# Patient Record
Sex: FEMALE | Race: WHITE | Employment: UNEMPLOYED | URBAN - METROPOLITAN AREA
[De-identification: names, ages, dates, MRNs, and addresses within clinical notes are randomized per-mention and may not be internally consistent; named-entity substitution may affect disease eponyms.]

---

## 2017-07-13 ENCOUNTER — TELEPHONE (OUTPATIENT)
Dept: FAMILY MEDICINE CLINIC | Facility: CLINIC | Age: 37
End: 2017-07-13

## 2017-07-13 DIAGNOSIS — Z00.00 LABORATORY EXAMINATION ORDERED AS PART OF A ROUTINE GENERAL MEDICAL EXAMINATION: Primary | ICD-10-CM

## 2017-07-13 DIAGNOSIS — Z13.29 SCREENING FOR ENDOCRINE, NUTRITIONAL, METABOLIC AND IMMUNITY DISORDER: ICD-10-CM

## 2017-07-13 DIAGNOSIS — Z13.228 SCREENING FOR ENDOCRINE, NUTRITIONAL, METABOLIC AND IMMUNITY DISORDER: ICD-10-CM

## 2017-07-13 DIAGNOSIS — Z13.0 SCREENING FOR IRON DEFICIENCY ANEMIA: ICD-10-CM

## 2017-07-13 DIAGNOSIS — Z13.21 SCREENING FOR ENDOCRINE, NUTRITIONAL, METABOLIC AND IMMUNITY DISORDER: ICD-10-CM

## 2017-07-13 DIAGNOSIS — Z13.0 SCREENING FOR ENDOCRINE, NUTRITIONAL, METABOLIC AND IMMUNITY DISORDER: ICD-10-CM

## 2017-07-14 NOTE — TELEPHONE ENCOUNTER
Can we order CBC, CMP, lipid for this patient under Nicole Prabhakar name: indication: general labs ordered as part of routine physical exam    thanks

## 2017-07-14 NOTE — TELEPHONE ENCOUNTER
Orders entered, Pt notified to  copy of orders to bring to [facility]. Copy at

## 2017-09-15 ENCOUNTER — TELEPHONE (OUTPATIENT)
Dept: FAMILY MEDICINE CLINIC | Facility: CLINIC | Age: 37
End: 2017-09-15

## 2017-09-18 ENCOUNTER — OFFICE VISIT (OUTPATIENT)
Dept: FAMILY MEDICINE CLINIC | Facility: CLINIC | Age: 37
End: 2017-09-18

## 2017-09-18 VITALS
DIASTOLIC BLOOD PRESSURE: 68 MMHG | BODY MASS INDEX: 20.97 KG/M2 | WEIGHT: 140 LBS | RESPIRATION RATE: 10 BRPM | HEIGHT: 68.5 IN | SYSTOLIC BLOOD PRESSURE: 102 MMHG | HEART RATE: 80 BPM

## 2017-09-18 DIAGNOSIS — Z00.00 ROUTINE GENERAL MEDICAL EXAMINATION AT A HEALTH CARE FACILITY: Primary | ICD-10-CM

## 2017-09-18 DIAGNOSIS — Z23 NEEDS FLU SHOT: ICD-10-CM

## 2017-09-18 PROCEDURE — 99395 PREV VISIT EST AGE 18-39: CPT | Performed by: FAMILY MEDICINE

## 2017-09-18 PROCEDURE — 90686 IIV4 VACC NO PRSV 0.5 ML IM: CPT | Performed by: FAMILY MEDICINE

## 2017-09-18 PROCEDURE — 90471 IMMUNIZATION ADMIN: CPT | Performed by: FAMILY MEDICINE

## 2017-09-18 NOTE — PROGRESS NOTES
HPI:   Randall Craft is a 29year old female who presents for a complete physical exam.    Last pap:  2015 - normal  Last mammogram:  n/a   Self exams:  yes  Menses:  Every 19-21 days  Contraception:  none  Previous colonoscopy:  n/a  Previous DEXA: Ht 68.5\"   Wt 140 lb   LMP 08/31/2017   Breastfeeding? No   BMI 20.98 kg/m²   Body mass index is 20.98 kg/m².    GENERAL: well developed, well nourished,in no apparent distress  SKIN: no rashes,no suspicious lesions  HEENT: atraumatic, normocephalic,throat

## 2017-12-13 ENCOUNTER — TELEPHONE (OUTPATIENT)
Dept: FAMILY MEDICINE CLINIC | Facility: CLINIC | Age: 37
End: 2017-12-13

## 2017-12-13 NOTE — TELEPHONE ENCOUNTER
Discussed results with patient, pt verbalized understanding.  Results were abstracted and then sent to scan

## 2018-08-30 PROBLEM — Z82.49 FAMILY HISTORY OF ASD (ATRIAL SEPTAL DEFECT): Status: ACTIVE | Noted: 2018-08-30

## 2018-08-30 PROBLEM — O09.529 AMA (ADVANCED MATERNAL AGE) MULTIGRAVIDA 35+: Status: ACTIVE | Noted: 2018-08-30

## 2018-08-30 PROBLEM — O09.299 HISTORY OF MACROSOMIA IN INFANT IN PRIOR PREGNANCY, CURRENTLY PREGNANT: Status: ACTIVE | Noted: 2018-08-30

## 2018-08-30 PROBLEM — Z82.79 FAMILY HISTORY OF ASD (ATRIAL SEPTAL DEFECT): Status: ACTIVE | Noted: 2018-08-30

## 2018-08-30 PROBLEM — O09.299 HISTORY OF MACROSOMIA IN INFANT IN PRIOR PREGNANCY, CURRENTLY PREGNANT (HCC): Status: ACTIVE | Noted: 2018-08-30

## 2018-08-30 PROBLEM — O09.529 AMA (ADVANCED MATERNAL AGE) MULTIGRAVIDA 35+ (HCC): Status: ACTIVE | Noted: 2018-08-30

## 2018-09-05 ENCOUNTER — TELEPHONE (OUTPATIENT)
Dept: FAMILY MEDICINE CLINIC | Facility: CLINIC | Age: 38
End: 2018-09-05

## 2018-09-05 NOTE — TELEPHONE ENCOUNTER
Pt is pregnant and has abdominal pain, cramping and diarrhea. Afebrile. Advised that pt call her OB. She verbalized understanding. She will call back if OB feels that it is not related to her pregnancy.

## 2018-09-24 PROCEDURE — 86901 BLOOD TYPING SEROLOGIC RH(D): CPT | Performed by: OBSTETRICS & GYNECOLOGY

## 2018-09-24 PROCEDURE — 86780 TREPONEMA PALLIDUM: CPT | Performed by: OBSTETRICS & GYNECOLOGY

## 2018-09-24 PROCEDURE — 87086 URINE CULTURE/COLONY COUNT: CPT | Performed by: OBSTETRICS & GYNECOLOGY

## 2018-09-24 PROCEDURE — 86850 RBC ANTIBODY SCREEN: CPT | Performed by: OBSTETRICS & GYNECOLOGY

## 2018-09-24 PROCEDURE — 86762 RUBELLA ANTIBODY: CPT | Performed by: OBSTETRICS & GYNECOLOGY

## 2018-09-24 PROCEDURE — 86900 BLOOD TYPING SEROLOGIC ABO: CPT | Performed by: OBSTETRICS & GYNECOLOGY

## 2018-09-24 PROCEDURE — 87389 HIV-1 AG W/HIV-1&-2 AB AG IA: CPT | Performed by: OBSTETRICS & GYNECOLOGY

## 2018-09-25 PROBLEM — Z67.91 RH NEGATIVE STATE IN ANTEPARTUM PERIOD (HCC): Status: ACTIVE | Noted: 2018-09-25

## 2018-09-25 PROBLEM — Z67.91 RH NEGATIVE STATE IN ANTEPARTUM PERIOD: Status: ACTIVE | Noted: 2018-09-25

## 2018-09-25 PROBLEM — O26.899 RH NEGATIVE STATE IN ANTEPARTUM PERIOD (HCC): Status: ACTIVE | Noted: 2018-09-25

## 2018-09-25 PROBLEM — O26.899 RH NEGATIVE STATE IN ANTEPARTUM PERIOD: Status: ACTIVE | Noted: 2018-09-25

## 2018-11-20 ENCOUNTER — OFFICE VISIT (OUTPATIENT)
Dept: PERINATAL CARE | Facility: HOSPITAL | Age: 38
End: 2018-11-20
Attending: OBSTETRICS & GYNECOLOGY
Payer: COMMERCIAL

## 2018-11-20 VITALS
HEIGHT: 69 IN | HEART RATE: 69 BPM | DIASTOLIC BLOOD PRESSURE: 75 MMHG | SYSTOLIC BLOOD PRESSURE: 141 MMHG | BODY MASS INDEX: 22.96 KG/M2 | WEIGHT: 155 LBS

## 2018-11-20 DIAGNOSIS — O09.522 MULTIGRAVIDA OF ADVANCED MATERNAL AGE IN SECOND TRIMESTER: ICD-10-CM

## 2018-11-20 DIAGNOSIS — Z82.49 FAMILY HISTORY OF ASD (ATRIAL SEPTAL DEFECT): ICD-10-CM

## 2018-11-20 PROCEDURE — 76811 OB US DETAILED SNGL FETUS: CPT | Performed by: OBSTETRICS & GYNECOLOGY

## 2018-11-20 PROCEDURE — 99243 OFF/OP CNSLTJ NEW/EST LOW 30: CPT | Performed by: OBSTETRICS & GYNECOLOGY

## 2018-11-20 NOTE — PROGRESS NOTES
Indication: prev baby with asd. Maternal age (40 years). ____________________________________________________________________________  History: Age: 40 years. Maternal age at [facility]: 41 years.  : 4 Para: 3.  _______________________________________ Normal.  Genitalia: Male fetus.     ____________________________________________________________________________  Maternal Structures:  Cervix: Cervical length: 45 mm.  ____________________________________________________________________________  Comments: generally shown good pregnancy outcomes in women of advanced reproductive age. However, these women are at increased risk of infertility and pregnancy complications.   They are more likely to conceive with the assistance of assisted reproductive technology age 36 and is as high as 28 percent in women over age 48. The incidence of gestational diabetes in the general obstetric population is 3 percent, rising to 7 to 15 percent in women over age 36 and 21 percent in women over age 48.   Women 28years of age o measurements are consistent with the established EDC. No ultrasound evidence of structural abnormalities are seen today. The nasal bone is present. No ultrasound evidence of markers for aneuploidy are seen.  She understands that ultrasound exam cannot ex

## 2018-12-20 ENCOUNTER — OFFICE VISIT (OUTPATIENT)
Dept: PERINATAL CARE | Facility: HOSPITAL | Age: 38
End: 2018-12-20
Attending: OBSTETRICS & GYNECOLOGY
Payer: COMMERCIAL

## 2018-12-20 VITALS — SYSTOLIC BLOOD PRESSURE: 130 MMHG | HEART RATE: 98 BPM | DIASTOLIC BLOOD PRESSURE: 80 MMHG

## 2018-12-20 DIAGNOSIS — Z82.49 FAMILY HISTORY OF ASD (ATRIAL SEPTAL DEFECT): ICD-10-CM

## 2018-12-20 DIAGNOSIS — O09.522 MULTIGRAVIDA OF ADVANCED MATERNAL AGE IN SECOND TRIMESTER: ICD-10-CM

## 2018-12-20 PROCEDURE — 76825 ECHO EXAM OF FETAL HEART: CPT | Performed by: OBSTETRICS & GYNECOLOGY

## 2018-12-20 PROCEDURE — 76827 ECHO EXAM OF FETAL HEART: CPT | Performed by: OBSTETRICS & GYNECOLOGY

## 2018-12-20 PROCEDURE — 99213 OFFICE O/P EST LOW 20 MIN: CPT | Performed by: OBSTETRICS & GYNECOLOGY

## 2018-12-20 PROCEDURE — 93325 DOPPLER ECHO COLOR FLOW MAPG: CPT | Performed by: OBSTETRICS & GYNECOLOGY

## 2018-12-20 NOTE — PROGRESS NOTES
Indication: child with VSD. Maternal age (41 years). ____________________________________________________________________________  History: Age: 41 years. Maternal age at [facility]: 41 years. : 4 Para: 3.  Last menstrual period: 2018.  ________ etiology found. Allergy - amoxicillin (hives)    PMH -   unremarkable     PSH -  none      Medication -    PNV    Family hx-  no family history of birth defects other than their daughter's VSD, no developmental delay and no known genetic syndromes. minutes in evaluation, consultation, and coordination of care. Greater than 50% of this time was spent in face to face discussion with the patient.

## 2019-01-10 ENCOUNTER — LAB ENCOUNTER (OUTPATIENT)
Dept: LAB | Age: 39
End: 2019-01-10
Attending: OBSTETRICS & GYNECOLOGY
Payer: COMMERCIAL

## 2019-01-10 ENCOUNTER — OFFICE VISIT (OUTPATIENT)
Dept: FAMILY MEDICINE CLINIC | Facility: CLINIC | Age: 39
End: 2019-01-10
Payer: COMMERCIAL

## 2019-01-10 VITALS
HEIGHT: 68.5 IN | SYSTOLIC BLOOD PRESSURE: 112 MMHG | RESPIRATION RATE: 16 BRPM | HEART RATE: 86 BPM | WEIGHT: 173 LBS | TEMPERATURE: 98 F | DIASTOLIC BLOOD PRESSURE: 66 MMHG | BODY MASS INDEX: 25.92 KG/M2

## 2019-01-10 DIAGNOSIS — B96.89 BACTERIAL CONJUNCTIVITIS OF BOTH EYES: Primary | ICD-10-CM

## 2019-01-10 DIAGNOSIS — H10.9 BACTERIAL CONJUNCTIVITIS OF BOTH EYES: Primary | ICD-10-CM

## 2019-01-10 DIAGNOSIS — Z36.9 ENCOUNTER FOR ANTENATAL SCREENING OF MOTHER: ICD-10-CM

## 2019-01-10 LAB
ANTIBODY SCREEN: NEGATIVE
GLUCOSE 1H P GLC SERPL-MCNC: 164 MG/DL
HCT VFR BLD AUTO: 31 % (ref 34–50)
HGB BLD-MCNC: 10.1 G/DL (ref 12–16)
T PALLIDUM AB SER QL IA: NONREACTIVE

## 2019-01-10 PROCEDURE — 87389 HIV-1 AG W/HIV-1&-2 AB AG IA: CPT

## 2019-01-10 PROCEDURE — 86780 TREPONEMA PALLIDUM: CPT

## 2019-01-10 PROCEDURE — 82950 GLUCOSE TEST: CPT

## 2019-01-10 PROCEDURE — 99213 OFFICE O/P EST LOW 20 MIN: CPT | Performed by: FAMILY MEDICINE

## 2019-01-10 PROCEDURE — 86850 RBC ANTIBODY SCREEN: CPT

## 2019-01-10 PROCEDURE — 85018 HEMOGLOBIN: CPT

## 2019-01-10 PROCEDURE — 36415 COLL VENOUS BLD VENIPUNCTURE: CPT

## 2019-01-10 PROCEDURE — 85014 HEMATOCRIT: CPT

## 2019-01-10 RX ORDER — POLYMYXIN B SULFATE AND TRIMETHOPRIM 1; 10000 MG/ML; [USP'U]/ML
SOLUTION OPHTHALMIC
Qty: 10 ML | Refills: 0 | Status: SHIPPED | OUTPATIENT
Start: 2019-01-10 | End: 2019-03-04

## 2019-01-10 NOTE — PROGRESS NOTES
Maryanne Kumar is a 41year old female. S:  Patient presents today with the following concerns:  · Began yesterday with bilateral eye redness. Not itching or painful. Eyes crusted shut with mucous this morning.   Has 3 children and watches two little o photophobia. No crusting currently.   NECK: supple,no adenopathy, no preauricular adenopathy  LUNGS: CTA, no RRW  CARDIO: RRR without murmur  EXTREMITIES: no edema  NEURO: Oriented times three,cranial nerves are intact,motor and sensory are grossly intact

## 2019-01-10 NOTE — PATIENT INSTRUCTIONS
Bacterial Conjunctivitis    You have an infection in the membranes covering the white part of the eye. This part of the eye is called the conjunctiva. The infection is called conjunctivitis.  The most common symptoms of conjunctivitis include a thick, pus © 8673-9142 [employer] [address]. [address] [facility], [facility] [place]. All rights reserved. This information is not intended as a substitute for professional medical care. Always follow your healthcare professional's instructions.

## 2019-01-12 ENCOUNTER — LABORATORY ENCOUNTER (OUTPATIENT)
Dept: LAB | Facility: HOSPITAL | Age: 39
End: 2019-01-12
Attending: OBSTETRICS & GYNECOLOGY
Payer: COMMERCIAL

## 2019-01-12 DIAGNOSIS — O99.810 ABNORMAL MATERNAL GLUCOSE TOLERANCE, ANTEPARTUM: ICD-10-CM

## 2019-01-12 LAB
1 HR GLUCOSE GESTATIONAL: 160 MG/DL
GLUCOSE 1H P GLC SERPL-MCNC: 143 MG/DL
GLUCOSE 3H P GLC SERPL-MCNC: 115 MG/DL
GLUCOSE BLD-MCNC: 72 MG/DL (ref 65–99)
GLUCOSE P FAST SERPL-MCNC: 72 MG/DL

## 2019-01-12 PROCEDURE — 82962 GLUCOSE BLOOD TEST: CPT

## 2019-01-12 PROCEDURE — 82951 GLUCOSE TOLERANCE TEST (GTT): CPT

## 2019-01-12 PROCEDURE — 82952 GTT-ADDED SAMPLES: CPT

## 2019-01-12 PROCEDURE — 36415 COLL VENOUS BLD VENIPUNCTURE: CPT

## 2019-02-11 ENCOUNTER — OFFICE VISIT (OUTPATIENT)
Dept: PERINATAL CARE | Facility: HOSPITAL | Age: 39
End: 2019-02-11
Attending: OBSTETRICS & GYNECOLOGY
Payer: COMMERCIAL

## 2019-02-11 VITALS
DIASTOLIC BLOOD PRESSURE: 76 MMHG | HEART RATE: 84 BPM | BODY MASS INDEX: 26 KG/M2 | WEIGHT: 172 LBS | SYSTOLIC BLOOD PRESSURE: 116 MMHG

## 2019-02-11 DIAGNOSIS — O09.523 MULTIGRAVIDA OF ADVANCED MATERNAL AGE IN THIRD TRIMESTER: ICD-10-CM

## 2019-02-11 PROCEDURE — 76819 FETAL BIOPHYS PROFIL W/O NST: CPT | Performed by: OBSTETRICS & GYNECOLOGY

## 2019-02-11 PROCEDURE — 99213 OFFICE O/P EST LOW 20 MIN: CPT | Performed by: OBSTETRICS & GYNECOLOGY

## 2019-02-11 PROCEDURE — 76805 OB US >/= 14 WKS SNGL FETUS: CPT | Performed by: OBSTETRICS & GYNECOLOGY

## 2019-02-11 NOTE — PROGRESS NOTES
Indication: Maternal age (41 years). ____________________________________________________________________________  History: Age: 41 years. Maternal age at [facility]: 41 years.  : 4 Para: 3.  _______________________________________________________________ 6. 9 cm. Q1: 4.4 cm. Q2: 6.9 cm. Q4: 3.0 cm. Biophysical Profile: Fetal body movements: normal (2), Fetal tone: normal (2), Fetal breathing movements: normal (2), Amniotic fluid volume: normal (2). Score 8 / 8.      Summary:  testing is reassuring recommended. The limitations of ultrasound were discussed. MACROSOMIA:  I discussed the risks of macrosomia including maternal and fetal risks. We discussed shoulder dystocias, brachial plexus injury, fractures and death.   I informed her, she is

## 2019-03-04 PROCEDURE — 87081 CULTURE SCREEN ONLY: CPT | Performed by: OBSTETRICS & GYNECOLOGY

## 2019-03-04 PROCEDURE — 87653 STREP B DNA AMP PROBE: CPT | Performed by: OBSTETRICS & GYNECOLOGY

## 2019-03-22 ENCOUNTER — TELEPHONE (OUTPATIENT)
Dept: OBGYN UNIT | Facility: HOSPITAL | Age: 39
End: 2019-03-22

## 2019-03-25 ENCOUNTER — TELEPHONE (OUTPATIENT)
Dept: OBGYN UNIT | Facility: HOSPITAL | Age: 39
End: 2019-03-25

## 2019-03-27 ENCOUNTER — APPOINTMENT (OUTPATIENT)
Dept: OBGYN CLINIC | Facility: HOSPITAL | Age: 39
End: 2019-03-27
Payer: COMMERCIAL

## 2019-03-27 ENCOUNTER — HOSPITAL ENCOUNTER (INPATIENT)
Facility: HOSPITAL | Age: 39
LOS: 1 days | Discharge: HOME | End: 2019-03-28
Attending: OBSTETRICS & GYNECOLOGY | Admitting: OBSTETRICS & GYNECOLOGY
Payer: COMMERCIAL

## 2019-03-27 PROBLEM — Z34.90 PREGNANCY: Status: ACTIVE | Noted: 2019-03-27

## 2019-03-27 PROBLEM — Z34.90 PREGNANCY (HCC): Status: ACTIVE | Noted: 2019-03-27

## 2019-03-27 PROCEDURE — 86901 BLOOD TYPING SEROLOGIC RH(D): CPT | Performed by: OBSTETRICS & GYNECOLOGY

## 2019-03-27 PROCEDURE — 3E033VJ INTRODUCTION OF OTHER HORMONE INTO PERIPHERAL VEIN, PERCUTANEOUS APPROACH: ICD-10-PCS | Performed by: OBSTETRICS & GYNECOLOGY

## 2019-03-27 PROCEDURE — 86900 BLOOD TYPING SEROLOGIC ABO: CPT | Performed by: OBSTETRICS & GYNECOLOGY

## 2019-03-27 PROCEDURE — 10907ZC DRAINAGE OF AMNIOTIC FLUID, THERAPEUTIC FROM PRODUCTS OF CONCEPTION, VIA NATURAL OR ARTIFICIAL OPENING: ICD-10-PCS | Performed by: OBSTETRICS & GYNECOLOGY

## 2019-03-27 PROCEDURE — 86780 TREPONEMA PALLIDUM: CPT | Performed by: OBSTETRICS & GYNECOLOGY

## 2019-03-27 PROCEDURE — 0HQ9XZZ REPAIR PERINEUM SKIN, EXTERNAL APPROACH: ICD-10-PCS | Performed by: OBSTETRICS & GYNECOLOGY

## 2019-03-27 PROCEDURE — 86850 RBC ANTIBODY SCREEN: CPT | Performed by: OBSTETRICS & GYNECOLOGY

## 2019-03-27 PROCEDURE — 85027 COMPLETE CBC AUTOMATED: CPT | Performed by: OBSTETRICS & GYNECOLOGY

## 2019-03-27 RX ORDER — HYDROCODONE BITARTRATE AND ACETAMINOPHEN 5; 325 MG/1; MG/1
1 TABLET ORAL EVERY 6 HOURS PRN
Status: DISCONTINUED | OUTPATIENT
Start: 2019-03-27 | End: 2019-03-28

## 2019-03-27 RX ORDER — TRISODIUM CITRATE DIHYDRATE AND CITRIC ACID MONOHYDRATE 500; 334 MG/5ML; MG/5ML
30 SOLUTION ORAL AS NEEDED
Status: DISCONTINUED | OUTPATIENT
Start: 2019-03-27 | End: 2019-03-27 | Stop reason: HOSPADM

## 2019-03-27 RX ORDER — ZOLPIDEM TARTRATE 5 MG/1
5 TABLET ORAL NIGHTLY PRN
Status: DISCONTINUED | OUTPATIENT
Start: 2019-03-27 | End: 2019-03-28

## 2019-03-27 RX ORDER — HYDROCODONE BITARTRATE AND ACETAMINOPHEN 10; 325 MG/1; MG/1
1 TABLET ORAL EVERY 4 HOURS PRN
Status: DISCONTINUED | OUTPATIENT
Start: 2019-03-27 | End: 2019-03-28

## 2019-03-27 RX ORDER — SODIUM CHLORIDE, SODIUM LACTATE, POTASSIUM CHLORIDE, CALCIUM CHLORIDE 600; 310; 30; 20 MG/100ML; MG/100ML; MG/100ML; MG/100ML
INJECTION, SOLUTION INTRAVENOUS CONTINUOUS
Status: DISCONTINUED | OUTPATIENT
Start: 2019-03-27 | End: 2019-03-27 | Stop reason: HOSPADM

## 2019-03-27 RX ORDER — NALBUPHINE HCL 10 MG/ML
2.5 AMPUL (ML) INJECTION
Status: DISCONTINUED | OUTPATIENT
Start: 2019-03-27 | End: 2019-03-27

## 2019-03-27 RX ORDER — SIMETHICONE 80 MG
80 TABLET,CHEWABLE ORAL 3 TIMES DAILY PRN
Status: DISCONTINUED | OUTPATIENT
Start: 2019-03-27 | End: 2019-03-28

## 2019-03-27 RX ORDER — IBUPROFEN 600 MG/1
600 TABLET ORAL EVERY 6 HOURS
Status: DISCONTINUED | OUTPATIENT
Start: 2019-03-27 | End: 2019-03-28

## 2019-03-27 RX ORDER — ACETAMINOPHEN 325 MG/1
650 TABLET ORAL EVERY 6 HOURS PRN
Status: DISCONTINUED | OUTPATIENT
Start: 2019-03-27 | End: 2019-03-28

## 2019-03-27 RX ORDER — TERBUTALINE SULFATE 1 MG/ML
0.25 INJECTION, SOLUTION SUBCUTANEOUS AS NEEDED
Status: DISCONTINUED | OUTPATIENT
Start: 2019-03-27 | End: 2019-03-27 | Stop reason: HOSPADM

## 2019-03-27 RX ORDER — DEXTROSE, SODIUM CHLORIDE, SODIUM LACTATE, POTASSIUM CHLORIDE, AND CALCIUM CHLORIDE 5; .6; .31; .03; .02 G/100ML; G/100ML; G/100ML; G/100ML; G/100ML
INJECTION, SOLUTION INTRAVENOUS AS NEEDED
Status: DISCONTINUED | OUTPATIENT
Start: 2019-03-27 | End: 2019-03-27 | Stop reason: HOSPADM

## 2019-03-27 RX ORDER — AMMONIA INHALANTS 0.04 G/.3ML
0.3 INHALANT RESPIRATORY (INHALATION) AS NEEDED
Status: DISCONTINUED | OUTPATIENT
Start: 2019-03-27 | End: 2019-03-27 | Stop reason: HOSPADM

## 2019-03-27 RX ORDER — IBUPROFEN 600 MG/1
600 TABLET ORAL ONCE AS NEEDED
Status: DISCONTINUED | OUTPATIENT
Start: 2019-03-27 | End: 2019-03-27 | Stop reason: HOSPADM

## 2019-03-27 RX ORDER — DOCUSATE SODIUM 100 MG/1
100 CAPSULE, LIQUID FILLED ORAL
Status: DISCONTINUED | OUTPATIENT
Start: 2019-03-27 | End: 2019-03-28

## 2019-03-27 RX ORDER — EPHEDRINE SULFATE/0.9% NACL/PF 25 MG/5 ML
5 SYRINGE (ML) INTRAVENOUS AS NEEDED
Status: DISCONTINUED | OUTPATIENT
Start: 2019-03-27 | End: 2019-03-27

## 2019-03-27 RX ORDER — BISACODYL 10 MG
10 SUPPOSITORY, RECTAL RECTAL ONCE AS NEEDED
Status: ACTIVE | OUTPATIENT
Start: 2019-03-27 | End: 2019-03-27

## 2019-03-27 NOTE — PROGRESS NOTES
Patient transferred to 1113/1113-A in wheelchair in stable condition. Infant in arms,  at side with belongings. Report phoned to Flaco Mccain prior to transfer.

## 2019-03-27 NOTE — L&D DELIVERY NOTE
Jhonny Cota [[id number]]    Labor Events     labor?:  No   steroids?:  None  Antibiotics received during labor?:  No  Antibiotics (enter # doses in comment):  none  Rupture date/time:  3/27/2019 1230     Rupture type:  AROM  Fluid colo Grimace Cry or active withdrawal    Muscle tone Limp Some flexion Active motion    Respiratory effort Absent Weak cry; hypoventilation Good, crying               1 Minute:   5 Minute:   10 Minute:   15 Minute:   20 Minute:     Skin color:   Heart rate:   R cord.  The cervix and vagina were inspected. A 1st degree perineal laceration was noted. The laceration was repaired with 3-0 vicryl in the usual fashion, after which excellent hemostasis was noted.   The fundus was again examined and noted to be firm and

## 2019-03-27 NOTE — H&P
[address] and Delivery Prenatal History and Physical Interval Addendum  Please see full Prenatal Record for this pregnancy      SUBJECTIVE:    Interval History:      This is a pregnancy at 44 2/7 weeks admitted for IOL at 44 + weeks with kaitlin

## 2019-03-27 NOTE — PROGRESS NOTES
OB PN    Comfortable with epidural.    /71   Pulse 86   Temp 97.9 °F (36.6 °C) (Temporal)   Resp 18   Ht 5' 8\" (1.727 m)   Wt 180 lb 4 oz (81.8 kg)   LMP 06/25/2018   SpO2 100%   BMI 27.41 kg/m²   EFM: 130/mod/+acc/-dec  [place]: q3-5 min  SVE: 2/50/-2

## 2019-03-27 NOTE — PROGRESS NOTES
Patient admitted to room 110 for induction of labor,  at side. Orientated to room and equipment. Plan of care discussed, questions encouraged and answered.

## 2019-03-28 VITALS
OXYGEN SATURATION: 100 % | BODY MASS INDEX: 27.32 KG/M2 | WEIGHT: 180.25 LBS | RESPIRATION RATE: 17 BRPM | HEART RATE: 81 BPM | SYSTOLIC BLOOD PRESSURE: 110 MMHG | TEMPERATURE: 99 F | DIASTOLIC BLOOD PRESSURE: 66 MMHG | HEIGHT: 68 IN

## 2019-03-28 PROCEDURE — 85025 COMPLETE CBC W/AUTO DIFF WBC: CPT | Performed by: OBSTETRICS & GYNECOLOGY

## 2019-03-28 NOTE — PROGRESS NOTES
[facility]  Post-Partum Vaginal Delivery Progress Note    [facility] [place] Patient Status:  Inpatient    12/10/1980 MRN [id number]   [facility] 1SW-J Attending Jesse Ocasio MD   Hosp Day # 1 PCP Joe Zaidi MD     SUBJECTIVE:

## 2019-03-28 NOTE — PAYOR COMM NOTE
--------------  ADMISSION REVIEW     Payor: [facility] #:  [id number]  Authorization Number:  [id number]    Admit date: 3/27/19  Admit time: [facility]       Admitting Physician: Maritza Farias MD  Attending Physician:  Venus Sneed Given 600 mg Oral Kirstie Muñoz RN    3/28/2019 0540 Given 600 mg Oral Nina Murphy RN    3/27/2019 2330 Given 600 mg Oral Sarah, Vilma Fuentes RN      oxyTOCIN (PITOCIN) 30 units/ 500 ml 0.9% NS premix infusion     Date Action Dose Route User    3/27/20 disposition:  to lab  Gases sent?:  No      Resuscitation    Method:  None               Measurements    Weight:  4300 g Length:  52.1 cm   Head circum.:  36.5 cm Chest circum. :  36 cm       Abdominal circum. :  34 cm         Placenta    Date/time: progressed to complete and pushed for 5 minutes.     Procedure: The patient was placed in the dorsal lithotomy position and prepped in the usual fashion. She was encouraged to push. The head delivered in the MATT position.   Loose nuchal cord x1 noted and

## 2019-03-29 PROBLEM — O09.529 AMA (ADVANCED MATERNAL AGE) MULTIGRAVIDA 35+: Status: RESOLVED | Noted: 2018-08-30 | Resolved: 2019-03-27

## 2019-03-29 PROBLEM — O26.899 RH NEGATIVE STATE IN ANTEPARTUM PERIOD: Status: RESOLVED | Noted: 2018-09-25 | Resolved: 2019-03-27

## 2019-03-29 PROBLEM — Z67.91 RH NEGATIVE STATE IN ANTEPARTUM PERIOD (HCC): Status: RESOLVED | Noted: 2018-09-25 | Resolved: 2019-03-27

## 2019-03-29 PROBLEM — Z82.79 FAMILY HISTORY OF ASD (ATRIAL SEPTAL DEFECT): Status: RESOLVED | Noted: 2018-08-30 | Resolved: 2019-03-27

## 2019-03-29 PROBLEM — Z82.49 FAMILY HISTORY OF ASD (ATRIAL SEPTAL DEFECT): Status: RESOLVED | Noted: 2018-08-30 | Resolved: 2019-03-27

## 2019-03-29 PROBLEM — O09.529 AMA (ADVANCED MATERNAL AGE) MULTIGRAVIDA 35+ (HCC): Status: RESOLVED | Noted: 2018-08-30 | Resolved: 2019-03-27

## 2019-03-29 PROBLEM — O09.299 HISTORY OF MACROSOMIA IN INFANT IN PRIOR PREGNANCY, CURRENTLY PREGNANT: Status: RESOLVED | Noted: 2018-08-30 | Resolved: 2019-03-27

## 2019-03-29 PROBLEM — O26.899 RH NEGATIVE STATE IN ANTEPARTUM PERIOD (HCC): Status: RESOLVED | Noted: 2018-09-25 | Resolved: 2019-03-27

## 2019-03-29 PROBLEM — O09.299 HISTORY OF MACROSOMIA IN INFANT IN PRIOR PREGNANCY, CURRENTLY PREGNANT (HCC): Status: RESOLVED | Noted: 2018-08-30 | Resolved: 2019-03-27

## 2019-03-29 PROBLEM — Z67.91 RH NEGATIVE STATE IN ANTEPARTUM PERIOD: Status: RESOLVED | Noted: 2018-09-25 | Resolved: 2019-03-27

## 2019-03-31 ENCOUNTER — TELEPHONE (OUTPATIENT)
Dept: OBGYN UNIT | Facility: HOSPITAL | Age: 39
End: 2019-03-31

## 2019-04-23 ENCOUNTER — OFFICE VISIT (OUTPATIENT)
Dept: FAMILY MEDICINE CLINIC | Facility: CLINIC | Age: 39
End: 2019-04-23
Payer: COMMERCIAL

## 2019-04-23 VITALS
HEART RATE: 76 BPM | BODY MASS INDEX: 23.49 KG/M2 | WEIGHT: 155 LBS | TEMPERATURE: 98 F | DIASTOLIC BLOOD PRESSURE: 74 MMHG | OXYGEN SATURATION: 97 % | HEIGHT: 68 IN | SYSTOLIC BLOOD PRESSURE: 112 MMHG | RESPIRATION RATE: 16 BRPM

## 2019-04-23 DIAGNOSIS — L72.3 INFLAMED EPIDERMOID CYST OF SKIN: Primary | ICD-10-CM

## 2019-04-23 PROCEDURE — 99213 OFFICE O/P EST LOW 20 MIN: CPT | Performed by: PHYSICIAN ASSISTANT

## 2019-04-23 RX ORDER — CEPHALEXIN 250 MG/1
250 CAPSULE ORAL 3 TIMES DAILY
Qty: 21 CAPSULE | Refills: 0 | Status: SHIPPED | OUTPATIENT
Start: 2019-04-23 | End: 2019-04-30

## 2019-04-23 NOTE — PROGRESS NOTES
CC: Left armpit bump     HISTORY OF PRESENT ILLNESS  Mahnaz Moctezuma is a 41year old female who presents for evaluation of bump left axilla. Noticed this yesterday. She had significantly more pain, swelling and redness last night.  She did do a heating pa

## 2019-05-21 PROCEDURE — 87624 HPV HI-RISK TYP POOLED RSLT: CPT | Performed by: OBSTETRICS & GYNECOLOGY

## 2019-05-21 PROCEDURE — 88175 CYTOPATH C/V AUTO FLUID REDO: CPT | Performed by: OBSTETRICS & GYNECOLOGY

## 2019-08-09 NOTE — PLAN OF CARE
Problem: SAFETY ADULT - FALL  Goal: Free from fall injury  INTERVENTIONS:  - Assess pt frequently for physical needs  - Identify cognitive and physical deficits and behaviors that affect risk of falls.   - [place] fall precautions as indicated by assessme Medical: Not on file     Non-medical: Not on file   Tobacco Use    Smoking status: Never Smoker    Smokeless tobacco: Never Used   Substance and Sexual Activity    Alcohol use: No     Alcohol/week: 0.0 standard drinks    Drug use: No    Sexual activity: Yes     Partners: Male   Lifestyle    Physical activity:     Days per week: Not on file     Minutes per session: Not on file    Stress: Not on file   Relationships    Social connections:     Talks on phone: Not on file     Gets together: Not on file     Attends Mandaeism service: Not on file     Active member of club or organization: Not on file     Attends meetings of clubs or organizations: Not on file     Relationship status: Not on file    Intimate partner violence:     Fear of current or ex partner: Not on file     Emotionally abused: Not on file     Physically abused: Not on file     Forced sexual activity: Not on file   Other Topics Concern    Not on file   Social History Narrative    Not on file       MEDICATIONS:  Current Outpatient Medications   Medication Sig Dispense Refill    levonorgestrel (MIRENA, 52 MG,) IUD 52 mg 1 each by Intrauterine route once       No current facility-administered medications for this visit. ALLERGIES:  Allergies as of 08/09/2019 - Review Complete 08/09/2019   Allergen Reaction Noted    Penicillins  09/08/2015       Symptoms of decreased mood absent  Symptoms of anhedonia absent    **If either question is answered in a  positive fashion then complete the PHQ9 Scoring Evaluation and make the appropriate referral**      Immunization status: stated as current, but no records available. Gynecologic History:  Menarche: 16 yo  Menopause at [age] yo     No LMP recorded. Patient has had an implant.     Sexually Active: Yes    STD History: No     Permanent Sterilization: No   Reversible Birth Control: Yes Mirena IUD 9/20/2015        Hormone Replacement Exposure: No      Genetic Qualified Family History of Breast,

## 2020-03-31 ENCOUNTER — PATIENT MESSAGE (OUTPATIENT)
Dept: FAMILY MEDICINE CLINIC | Facility: CLINIC | Age: 40
End: 2020-03-31

## 2020-04-01 NOTE — TELEPHONE ENCOUNTER
From: Vinod Gay  To: Camilla Vargas MD  Sent: 3/31/2020 8:58 PM CDT  Subject: Non-Urgent Medical Question    Hi,   I haven't seen you in some time but did see your colleague about a year ago for a minor issue. Hope all is well.      I believe I ma

## 2020-04-03 ENCOUNTER — TELEPHONE (OUTPATIENT)
Dept: FAMILY MEDICINE CLINIC | Facility: CLINIC | Age: 40
End: 2020-04-03

## 2020-04-03 DIAGNOSIS — Z20.822 SUSPECTED COVID-19 VIRUS INFECTION: Primary | ICD-10-CM

## 2020-04-03 PROCEDURE — 99213 OFFICE O/P EST LOW 20 MIN: CPT | Performed by: FAMILY MEDICINE

## 2020-04-03 RX ORDER — ALBUTEROL SULFATE 90 UG/1
2 AEROSOL, METERED RESPIRATORY (INHALATION) EVERY 6 HOURS PRN
Qty: 1 INHALER | Refills: 1 | Status: SHIPPED | OUTPATIENT
Start: 2020-04-03 | End: 2021-04-03

## 2020-04-03 NOTE — TELEPHONE ENCOUNTER
Virtual Check-In    Vipin Saldaña verbally consents to a [employer] on 04/03/20. Patient understands and accepts financial responsibility for any deductible, co-insurance and/or co-pays associated with this service.     Duration of the ser

## 2021-10-11 ENCOUNTER — TELEPHONE (OUTPATIENT)
Dept: FAMILY MEDICINE CLINIC | Facility: CLINIC | Age: 41
End: 2021-10-11

## 2021-10-11 DIAGNOSIS — Z00.00 ROUTINE GENERAL MEDICAL EXAMINATION AT A HEALTH CARE FACILITY: Primary | ICD-10-CM

## 2021-10-11 NOTE — TELEPHONE ENCOUNTER
Please enter lab orders for the patient's upcoming physical appointment. Physical scheduled:    Your appointments     Date & Time Appointment Department [facility])    Nov 02, 2021  2:00 PM CDT Physical - Established with Ibrahima Encinas  [facility]

## 2023-02-11 ENCOUNTER — APPOINTMENT (OUTPATIENT)
Dept: CT IMAGING | Age: 43
End: 2023-02-11
Attending: PHYSICIAN ASSISTANT
Payer: COMMERCIAL

## 2023-02-11 ENCOUNTER — HOSPITAL ENCOUNTER (OUTPATIENT)
Age: 43
Discharge: HOME | End: 2023-02-11
Payer: COMMERCIAL

## 2023-02-11 VITALS
HEIGHT: 68 IN | HEART RATE: 57 BPM | TEMPERATURE: 98 F | OXYGEN SATURATION: 98 % | SYSTOLIC BLOOD PRESSURE: 121 MMHG | WEIGHT: 150 LBS | BODY MASS INDEX: 22.73 KG/M2 | DIASTOLIC BLOOD PRESSURE: 83 MMHG | RESPIRATION RATE: 16 BRPM

## 2023-02-11 DIAGNOSIS — S09.90XA INJURY OF HEAD, INITIAL ENCOUNTER: Primary | ICD-10-CM

## 2023-02-11 DIAGNOSIS — S16.1XXA NECK MUSCLE STRAIN, INITIAL ENCOUNTER: ICD-10-CM

## 2023-02-11 LAB — B-HCG UR QL: NEGATIVE

## 2023-02-11 PROCEDURE — 99214 OFFICE O/P EST MOD 30 MIN: CPT

## 2023-02-11 PROCEDURE — 96372 THER/PROPH/DIAG INJ SC/IM: CPT

## 2023-02-11 PROCEDURE — 81025 URINE PREGNANCY TEST: CPT

## 2023-02-11 PROCEDURE — 99204 OFFICE O/P NEW MOD 45 MIN: CPT

## 2023-02-11 PROCEDURE — 70450 CT HEAD/BRAIN W/O DYE: CPT | Performed by: PHYSICIAN ASSISTANT

## 2023-02-11 RX ORDER — KETOROLAC TROMETHAMINE 30 MG/ML
30 INJECTION, SOLUTION INTRAMUSCULAR; INTRAVENOUS ONCE
Status: COMPLETED | OUTPATIENT
Start: 2023-02-11 | End: 2023-02-11

## 2023-02-11 RX ORDER — PREDNISONE 20 MG/1
40 TABLET ORAL DAILY
Qty: 6 TABLET | Refills: 0 | Status: SHIPPED | OUTPATIENT
Start: 2023-02-11 | End: 2023-02-14

## 2023-02-11 RX ORDER — CYCLOBENZAPRINE HCL 10 MG
10 TABLET ORAL 3 TIMES DAILY PRN
Qty: 20 TABLET | Refills: 0 | Status: SHIPPED | OUTPATIENT
Start: 2023-02-11

## 2023-02-11 RX ORDER — DEXAMETHASONE 4 MG/1
12 TABLET ORAL ONCE
Status: COMPLETED | OUTPATIENT
Start: 2023-02-11 | End: 2023-02-11
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
## 2023-02-11 NOTE — DISCHARGE INSTRUCTIONS
Please return to the ER/clinic if symptoms worsen. Follow-up with your PCP in 24-48 hours as needed. The Toradol and Decadron will work in your system in the next several days. Recommend taking naproxen twice daily with food. Also give a muscle relaxant to take before bed but allow 8 hours for operating machinery. If anything changes i.e. increased nausea vomiting's headache or blurry vision go directly to the emergency room. Otherwise follow-up with her primary care physician for further evaluation and treatment.

## 2023-02-11 NOTE — ED QUICK NOTES
Reviewed discharge information with patient. Patient verbalized understanding, no further questions or complaints at this time. Patient is alert and orientated x4, in no apparent distress, ambulating with steady gait. No IV in. Instructed patient to go to ED for any new or worsening symptoms. Patient instructed to not drive for 8 hours after taking sedating medication. Patient verbalized understanding.

## 2023-02-13 ENCOUNTER — TELEPHONE (OUTPATIENT)
Dept: FAMILY MEDICINE CLINIC | Facility: CLINIC | Age: 43
End: 2023-02-13

## 2023-02-13 DIAGNOSIS — Z00.00 ROUTINE GENERAL MEDICAL EXAMINATION AT A HEALTH CARE FACILITY: Primary | ICD-10-CM

## 2023-02-13 NOTE — TELEPHONE ENCOUNTER
Please enter lab orders for the patient's upcoming physical appointment. Physical scheduled: Your appointments     Date & Time Appointment Department [facility])    May 09, 2023 12:30 PM CDT Physical - Established with Katerina Lloyd MD [address], [address] [address] [place] [address], [place] ([facility])            Dahiana Bernard [facility] [address] [address] [phone]         Preferred lab: [facility]     The patient has been notified to complete fasting labs prior to their physical appointment.

## 2023-05-09 ENCOUNTER — OFFICE VISIT (OUTPATIENT)
Dept: FAMILY MEDICINE CLINIC | Facility: CLINIC | Age: 43
End: 2023-05-09
Payer: COMMERCIAL

## 2023-05-09 VITALS
WEIGHT: 160 LBS | SYSTOLIC BLOOD PRESSURE: 116 MMHG | HEIGHT: 69 IN | DIASTOLIC BLOOD PRESSURE: 78 MMHG | TEMPERATURE: 99 F | HEART RATE: 82 BPM | BODY MASS INDEX: 23.7 KG/M2 | RESPIRATION RATE: 16 BRPM

## 2023-05-09 DIAGNOSIS — Z12.4 SCREENING FOR CERVICAL CANCER: ICD-10-CM

## 2023-05-09 DIAGNOSIS — Z00.00 ROUTINE GENERAL MEDICAL EXAMINATION AT A HEALTH CARE FACILITY: Primary | ICD-10-CM

## 2023-05-09 DIAGNOSIS — Z11.51 SCREENING FOR HPV (HUMAN PAPILLOMAVIRUS): ICD-10-CM

## 2023-05-09 DIAGNOSIS — Z12.31 VISIT FOR SCREENING MAMMOGRAM: ICD-10-CM

## 2023-05-09 PROBLEM — Z34.90 PREGNANCY: Status: RESOLVED | Noted: 2019-03-27 | Resolved: 2023-05-09

## 2023-05-09 PROBLEM — Z34.90 PREGNANCY (HCC): Status: RESOLVED | Noted: 2019-03-27 | Resolved: 2023-05-09

## 2023-05-09 PROCEDURE — 99396 PREV VISIT EST AGE 40-64: CPT | Performed by: FAMILY MEDICINE

## 2023-05-09 PROCEDURE — 3008F BODY MASS INDEX DOCD: CPT | Performed by: FAMILY MEDICINE

## 2023-05-09 PROCEDURE — 3074F SYST BP LT 130 MM HG: CPT | Performed by: FAMILY MEDICINE

## 2023-05-09 PROCEDURE — 3078F DIAST BP <80 MM HG: CPT | Performed by: FAMILY MEDICINE

## 2023-05-12 LAB — HPV MRNA E6/E7: NOT DETECTED

## 2024-10-17 ENCOUNTER — APPOINTMENT (OUTPATIENT)
Dept: GENERAL RADIOLOGY | Facility: HOSPITAL | Age: 44
End: 2024-10-17
Attending: EMERGENCY MEDICINE
Payer: COMMERCIAL

## 2024-10-17 ENCOUNTER — HOSPITAL ENCOUNTER (EMERGENCY)
Facility: HOSPITAL | Age: 44
Discharge: HOME | End: 2024-10-18
Attending: EMERGENCY MEDICINE
Payer: COMMERCIAL

## 2024-10-17 DIAGNOSIS — E86.1 HYPOVOLEMIA: ICD-10-CM

## 2024-10-17 DIAGNOSIS — R55 SYNCOPE, VASOVAGAL: Primary | ICD-10-CM

## 2024-10-17 LAB
ALBUMIN SERPL-MCNC: 4 G/DL (ref 3.2–4.8)
ALBUMIN/GLOB SERPL: 1.9 {RATIO} (ref 1–2)
ALP LIVER SERPL-CCNC: 49 U/L
ALT SERPL-CCNC: 15 U/L
ANION GAP SERPL CALC-SCNC: 4 MMOL/L (ref 0–18)
AST SERPL-CCNC: 17 U/L (ref ?–34)
BASOPHILS # BLD AUTO: 0.06 X10(3) UL (ref 0–0.2)
BASOPHILS NFR BLD AUTO: 0.6 %
BILIRUB SERPL-MCNC: 0.3 MG/DL (ref 0.3–1.2)
BUN BLD-MCNC: 9 MG/DL (ref 9–23)
CALCIUM BLD-MCNC: 9.6 MG/DL (ref 8.7–10.4)
CHLORIDE SERPL-SCNC: 108 MMOL/L (ref 98–112)
CO2 SERPL-SCNC: 25 MMOL/L (ref 21–32)
CREAT BLD-MCNC: 0.59 MG/DL
D DIMER PPP FEU-MCNC: <0.27 UG/ML FEU (ref ?–0.5)
EGFRCR SERPLBLD CKD-EPI 2021: 115 ML/MIN/1.73M2 (ref 60–?)
EOSINOPHIL # BLD AUTO: 0.29 X10(3) UL (ref 0–0.7)
EOSINOPHIL NFR BLD AUTO: 2.8 %
ERYTHROCYTE [DISTWIDTH] IN BLOOD BY AUTOMATED COUNT: 12.3 %
FLUAV + FLUBV RNA SPEC NAA+PROBE: NEGATIVE
FLUAV + FLUBV RNA SPEC NAA+PROBE: NEGATIVE
GLOBULIN PLAS-MCNC: 2.1 G/DL (ref 2–3.5)
GLUCOSE BLD-MCNC: 96 MG/DL (ref 70–99)
HCT VFR BLD AUTO: 36.3 %
HGB BLD-MCNC: 12.4 G/DL
IMM GRANULOCYTES # BLD AUTO: 0.03 X10(3) UL (ref 0–1)
IMM GRANULOCYTES NFR BLD: 0.3 %
LYMPHOCYTES # BLD AUTO: 2.66 X10(3) UL (ref 1–4)
LYMPHOCYTES NFR BLD AUTO: 25.9 %
MCH RBC QN AUTO: 27.5 PG (ref 26–34)
MCHC RBC AUTO-ENTMCNC: 34.2 G/DL (ref 31–37)
MCV RBC AUTO: 80.5 FL
MONOCYTES # BLD AUTO: 1.03 X10(3) UL (ref 0.1–1)
MONOCYTES NFR BLD AUTO: 10 %
NEUTROPHILS # BLD AUTO: 6.21 X10 (3) UL (ref 1.5–7.7)
NEUTROPHILS # BLD AUTO: 6.21 X10(3) UL (ref 1.5–7.7)
NEUTROPHILS NFR BLD AUTO: 60.4 %
OSMOLALITY SERPL CALC.SUM OF ELEC: 283 MOSM/KG (ref 275–295)
PLATELET # BLD AUTO: 378 10(3)UL (ref 150–450)
POTASSIUM SERPL-SCNC: 3.6 MMOL/L (ref 3.5–5.1)
PROT SERPL-MCNC: 6.1 G/DL (ref 5.7–8.2)
RBC # BLD AUTO: 4.51 X10(6)UL
RSV RNA SPEC NAA+PROBE: NEGATIVE
SARS-COV-2 RNA RESP QL NAA+PROBE: NOT DETECTED
SODIUM SERPL-SCNC: 137 MMOL/L (ref 136–145)
TROPONIN I SERPL HS-MCNC: <3 NG/L
WBC # BLD AUTO: 10.3 X10(3) UL (ref 4–11)

## 2024-10-17 PROCEDURE — 93005 ELECTROCARDIOGRAM TRACING: CPT

## 2024-10-17 PROCEDURE — 71045 X-RAY EXAM CHEST 1 VIEW: CPT | Performed by: EMERGENCY MEDICINE

## 2024-10-17 PROCEDURE — 85025 COMPLETE CBC W/AUTO DIFF WBC: CPT | Performed by: EMERGENCY MEDICINE

## 2024-10-17 PROCEDURE — 99285 EMERGENCY DEPT VISIT HI MDM: CPT

## 2024-10-17 PROCEDURE — 93010 ELECTROCARDIOGRAM REPORT: CPT

## 2024-10-17 PROCEDURE — 80053 COMPREHEN METABOLIC PANEL: CPT | Performed by: EMERGENCY MEDICINE

## 2024-10-17 PROCEDURE — 85379 FIBRIN DEGRADATION QUANT: CPT | Performed by: EMERGENCY MEDICINE

## 2024-10-17 PROCEDURE — 84484 ASSAY OF TROPONIN QUANT: CPT | Performed by: EMERGENCY MEDICINE

## 2024-10-17 PROCEDURE — 96360 HYDRATION IV INFUSION INIT: CPT

## 2024-10-17 PROCEDURE — 0241U SARS-COV-2/FLU A AND B/RSV BY PCR (GENEXPERT): CPT | Performed by: EMERGENCY MEDICINE

## 2024-10-18 ENCOUNTER — TELEPHONE (OUTPATIENT)
Dept: FAMILY MEDICINE CLINIC | Facility: CLINIC | Age: 44
End: 2024-10-18

## 2024-10-18 VITALS
HEART RATE: 66 BPM | BODY MASS INDEX: 21 KG/M2 | OXYGEN SATURATION: 98 % | DIASTOLIC BLOOD PRESSURE: 82 MMHG | SYSTOLIC BLOOD PRESSURE: 109 MMHG | WEIGHT: 145 LBS | RESPIRATION RATE: 13 BRPM

## 2024-10-18 LAB
ATRIAL RATE: 63 BPM
P AXIS: 58 DEGREES
P-R INTERVAL: 166 MS
Q-T INTERVAL: 426 MS
QRS DURATION: 86 MS
QTC CALCULATION (BEZET): 435 MS
R AXIS: 53 DEGREES
T AXIS: 51 DEGREES
VENTRICULAR RATE: 63 BPM

## 2024-10-18 NOTE — ED INITIAL ASSESSMENT (HPI)
Pt to ED for multiple syncopal episodes this evening. Pt woke up this morning not feeling well, she donated plasm today around noon and started feeling worse. Pt stated she felt light headed and weak. During syncopal episodes pt did hit her chin and has an small abrasion. Pt also states that the back of R knee has numbness and tingling.

## 2024-10-18 NOTE — TELEPHONE ENCOUNTER
[facility] \"Recently passed out at home. \"    Should she be seen sooner?

## 2024-10-18 NOTE — ED PROVIDER NOTES
Patient Seen in: [facility] Emergency Department      History   No chief complaint on file.    Stated Complaint: Syncope    Subjective:   HPI    A 43-year-old female presented with a feeling of weakness and near syncope while sitting down at home in the evening. She reported feeling unwell throughout the night, with a sensation of being about to pass out. She also mentioned feeling lightheaded and woozy earlier in the evening, around 8:00 PM, after returning home and putting her children to bed. The patient had donated plasma earlier in the day, a procedure she has been doing regularly for a few months. She reported feeling slightly lightheaded towards the end of the procedure, which was a new experience for her. She also mentioned that she had felt similar symptoms two years ago after donating plasma, which led her to stop the procedure at that time. After returning home from the plasma donation, she experienced pain behind her right knee, which persisted throughout the night. She did not report any fall or injury that could have caused the pain.  The patient reported feeling unwell immediately after the plasma donation, with symptoms including lightheadedness and leg pain. She was able to drive home without any issues and did not feel the need to pull over. She had a busy evening attending sports games and practices, and driving around town. She reported eating a cheeseburger and some chicken and rice, and drinking water and Diet Coke. She did not report any headache but mentioned feeling like she was coming down with a cold. Later in the evening, when the house was quiet, she noticed that she was feeling particularly unwell. She looked in the mirror and noticed that her face was pale. At the time of the consultation, she reported that her leg was no longer hurting. . She did not report any other symptoms. The patient's symptoms started after the plasma donation and worsened in the evening. She reported  feeling unwell throughout the night, with a sensation of being about to pass out. The severity of her symptoms led her to seek emergency medical attention.        History reviewed. No pertinent surgical history.             Social History     Socioeconomic History    Marital status:    Tobacco Use    Smoking status: Never    Smokeless tobacco: Never   Vaping Use    Vaping status: Never Used   Substance and Sexual Activity    Alcohol use: No     Alcohol/week: 0.0 standard drinks of alcohol     Types: 2 - 3 Standard drinks or equivalent per week    Drug use: No    Sexual activity: Yes     Partners: Male   Other Topics Concern    Caffeine Concern Yes     Comment: 1-2 cup coffee daily    Exercise Yes     Comment: 3-4 times a week    Seat Belt Yes    Self-Exams No                  Physical Exam     ED Triage Vitals [10/17/24 2209]   /86   Pulse 70   Resp 18   Temp    Temp src    SpO2 100 %   O2 Device None (Room air)       Current Vitals:   Vital Signs  BP: 109/82  Pulse: 66  Resp: 13  MAP (mmHg): 92    Oxygen Therapy  SpO2: 98 %  O2 Device: None (Room air)        Physical Exam    Vital signs reviewed  General appearance: Patient is alert and does appear pale  HEENT: Pupils equal react to light extraocular muscles intact no scleral icterus, mucous membranes are moist, there is no erythema or exudate in the posterior pharynx  Neck: Supple no JVD no lymphadenopathy no meningismus no carotid bruit  CV: Regular rate and rhythm no murmur rub  Respiratory: Clear to auscultation bilaterally no crackles no wheezes no accessory muscle use  Abdomen: Soft nontender nondistended, no rebound no guarding  no hepatosplenomegaly bowel sounds are present , no pulsatile mass  Extremities: No clubbing cyanosis or edema 2+ distal pulses.  No tenderness in either popliteal fossa good pulses distally  Neuro: Cranial nerves II through XII intact with no gross focal sensory or motor abnormality.      ED Course     Labs Reviewed    CBC WITH DIFFERENTIAL WITH PLATELET - Abnormal; Notable for the following components:       Result Value    Monocyte Absolute 1.03 (*)     All other components within normal limits   COMP METABOLIC PANEL (14) - Normal   TROPONIN I HIGH SENSITIVITY - Normal   D-DIMER - Normal   SARS-COV-2/FLU A AND B/RSV BY PCR (GENEXPERT) - Normal    Narrative:     This test is intended for the qualitative detection and differentiation of SARS-CoV-2, influenza A, influenza B, and respiratory syncytial virus (RSV) viral RNA in nasopharyngeal or nares swabs from individuals suspected of respiratory viral infection consistent with COVID-19 by their healthcare provider. Signs and symptoms of respiratory viral infection due to SARS-CoV-2, influenza, and RSV can be similar.    Test performed using the Xpert Xpress SARS-CoV-2/FLU/RSV (real time RT-PCR)  assay on the [employer] instrument, [employer], [place] [address].   This test is being used under the Food and Drug Administration's Emergency Use Authorization.    The authorized Fact Sheet for Healthcare Providers for this assay is available upon request from the laboratory.   RAINBOW DRAW LAVENDER   RAINBOW DRAW LIGHT GREEN   RAINBOW DRAW BLUE   RAINBOW DRAW GOLD     EKG    Rate, intervals and axes as noted on EKG Report.  Rate: 63  Rhythm: Sinus Rhythm  Reading: Normal sinus rhythm                Was evaluated had a CBC chemistry troponin D-dimer COVID flu and RSV workup was completely unremarkable.  Patient was given a liter of normal saline.  Will reassess after fluids do feel she may just be a little hypovolemic due to the plasma and was possibly orthostatic.     XR CHEST AP PORTABLE  (CPT=71045)    Result Date: 10/17/2024  PROCEDURE:  XR CHEST AP PORTABLE  (CPT=71045)  TECHNIQUE:  AP chest radiograph was obtained.  COMPARISON:  None.  INDICATIONS:  Syncope  PATIENT STATED HISTORY: (As transcribed by Technologist)  Patient states she had syncopal episode at home this evening after  feeling weak and lightheaded today. No chest complaints per patient but still lightheaded and fatigued.    FINDINGS: Cardiac silhouette and pulmonary vasculature are unremarkable. No consolidation, pleural effusion or pneumothorax. IMPRESSION: Unremarkable portable chest radiograph.  LOCATION:  [facility]      Dictated by (CST): Joel Lindo MD on 10/17/2024 at 11:05 PM     Finalized by (CST): Joel Lindo MD on 10/17/2024 at 11:05 PM    patient was feeling much better after the IV fluids.  Will check orthostatic blood pressures and if they are normal feel comfortable letting her go home.      Orthostatics were normal and patient was feeling much better after the IV fluids.  MDM      Differential diagnosis reflecting the complexity of care include: Syncopal episode, vasovagal syncope, hypovolemia, hypoglycemia    My independent interpretation of studies of: Chest x-ray was completely unremarkable no consolidation or effusion.      Shared decision making was done by myself patient and her  she was comfortable going home we will drink plenty of fluids return if any worsening symptoms      Patient was screened and evaluated during this visit.  As the treating physician attending to the patient, I determined within reasonable clinical confidence and prior to discharge, that an emergency medical condition was not or was no longer present.  There was no indication for further evaluation, treatment, or admission on an emergency basis.  Comprehensive verbal and written discharge and follow-up instructions were provided to help prevent relapse or worsening.  Patient was instructed to follow-up with primary care provider for further evaluation treatment, return immediately to ER for worsening, concerning, new, or changing/persisting symptoms.  I discussed the case with the patient and they had no questions, complaints, or concerns.  Patient was comfortable going home.      Dictation Disclaimer Note:   To  increase efficiency this document may have been prepared using voice recognition technology. Every effort has been made to correct any errors made during preparation of this note. However, if a word or phrase is confusing, or does not make sense, this is likely due to a recognition error within the program which was not discovered during editing. Please do not hesitate to contact to address any significant errors.    Note to Patient:   The 21st Century Cures Act makes medical notes like these available to patients in the interest of transparency. Please be advised this is a medical document. Medical documents are intended to carry relevant information, facts as evident, and the clinical opinion of the practitioner. The medical note is intended as peer to peer communication and may appear blunt or direct. It is written in medical language and may contain abbreviations or verbiage that are unfamiliar.         Medical Decision Making      Disposition and Plan     Clinical Impression:  1. Syncope, vasovagal    2. Hypovolemia         Disposition:  Discharge  10/18/2024 12:32 am    Follow-up:  Tiara Cochran MD  [address] [facility] [address]  [facility] [address]  [phone]    Follow up            Medications Prescribed:  Discharge Medication List as of 10/18/2024 12:47 AM              Supplementary Documentation:

## 2024-10-21 ENCOUNTER — OFFICE VISIT (OUTPATIENT)
Dept: FAMILY MEDICINE CLINIC | Facility: CLINIC | Age: 44
End: 2024-10-21
Payer: COMMERCIAL

## 2024-10-21 VITALS — TEMPERATURE: 98 F | OXYGEN SATURATION: 99 %

## 2024-10-21 DIAGNOSIS — Z09 HOSPITAL DISCHARGE FOLLOW-UP: Primary | ICD-10-CM

## 2024-10-21 DIAGNOSIS — R55 VASOVAGAL SYNCOPE: ICD-10-CM

## 2024-10-21 NOTE — PROGRESS NOTES
[facility]    Chief Complaint   Patient presents with    Follow - Up     Seen in ER Thursday night, passed out at home.  She was putting kids to bed, felt off, sat down on stairs dizzy, SOB and then passed out.          HPI:   Tiera Núñez is 43 year old patient presenting for the followin. ED follow up vasovagal syncope. Patient was seen on 10/17/2024 in the emergency room after syncopal episode, notes she donated plasma earlier in the day. ED results included:  COVID19 negative, D-dimer <0.27, Trop negative, glucose 96, Sodium 137, K 3.6, Cr 0.59, , AST and ALT WNL, WBC 10.3, Hgb 12.4, plt 378.   EKG showed NSR without ST-Tw changes  CXR chest unremarkable/no acute infection      Patient notes that she was not feeling completely well in the morning. While donating blood she felt her toes were tingly. She usually donates plasma every 6 months.   After put kids to bed she was going to pass out. She called 911.   She is uncertain if she fell. She is sore under her chin. Arm felt sore the following day.   When she came back to consciousness she was shivering.     Every day since passing out she felt her feet were tingling, fingers tingling. Has been able to drive over the past couple of days. She feels fine when busy. Feels uncomfortable as though something is not right.      PMH:  Patient Active Problem List   Diagnosis   (none) - all problems resolved or deleted     No past medical history on file.       SH: reviewed     FH: reviewed        ROS: full 10 point review of systems done and otherwise negative.      Healthcare Maintenance:       PE:  Vital Signs    10/21/24 1118   BP: 110/70   Pulse: 64   Temp: 98.1 °F (36.7 °C)     Wt Readings from Last 3 Encounters:   10/17/24 145 lb (65.8 kg)   23 160 lb (72.6 kg)   23 150 lb (68 kg)     There is no height or weight on file to calculate BMI.     Physical Exam:  GEN: Well-appearing, NAD, nontoxic  HEENT: NC/AT, PERRL,  EOMI, TM without erythema or bulging bilaterally and normal ear canals, no nasal polyps, oropharynx clear without erythema or exudate, MMM  CARD: RRR, no m/r/g  PULM: CTA jude  ABD: soft, nt, nd  EXT: No gross deformity  NEURO: no gross deficit  PSYCH: normal affect, thought process linear     Admission on 10/17/2024, Discharged on 10/18/2024   Component Date Value Ref Range Status    Hold Lavender 10/17/2024 Auto Resulted   Final    Hold Lt Green 10/17/2024 Auto Resulted   Final    Hold Blue 10/17/2024 Auto Resulted   Final    Hold Gold 10/17/2024 Auto Resulted   Final    Ventricular rate 10/17/2024 63  BPM Final    Atrial rate 10/17/2024 63  BPM Final    P-R Interval 10/17/2024 166  ms Final    QRS Duration 10/17/2024 86  ms Final    Q-T Interval 10/17/2024 426  ms Final    QTC Calculation (Bezet) 10/17/2024 435  ms Final    P Axis 10/17/2024 58  degrees Final    R Axis 10/17/2024 53  degrees Final    T Axis 10/17/2024 51  degrees Final    WBC 10/17/2024 10.3  4.0 - 11.0 x10(3) uL Final    RBC 10/17/2024 4.51  3.80 - 5.30 x10(6)uL Final    HGB 10/17/2024 12.4  12.0 - 16.0 g/dL Final    HCT 10/17/2024 36.3  35.0 - 48.0 % Final    PLT 10/17/2024 378.0  150.0 - 450.0 10(3)uL Final    MCV 10/17/2024 80.5  80.0 - 100.0 fL Final    MCH 10/17/2024 27.5  26.0 - 34.0 pg Final    MCHC 10/17/2024 34.2  31.0 - 37.0 g/dL Final    RDW 10/17/2024 12.3  % Final    Neutrophil Absolute Prelim 10/17/2024 6.21  1.50 - 7.70 x10 (3) uL Final    Neutrophil Absolute 10/17/2024 6.21  1.50 - 7.70 x10(3) uL Final    Lymphocyte Absolute 10/17/2024 2.66  1.00 - 4.00 x10(3) uL Final    Monocyte Absolute 10/17/2024 1.03 (H)  0.10 - 1.00 x10(3) uL Final    Eosinophil Absolute 10/17/2024 0.29  0.00 - 0.70 x10(3) uL Final    Basophil Absolute 10/17/2024 0.06  0.00 - 0.20 x10(3) uL Final    Immature Granulocyte Absolute 10/17/2024 0.03  0.00 - 1.00 x10(3) uL Final    Neutrophil % 10/17/2024 60.4  % Final    Lymphocyte % 10/17/2024 25.9  % Final     Monocyte % 10/17/2024 10.0  % Final    Eosinophil % 10/17/2024 2.8  % Final    Basophil % 10/17/2024 0.6  % Final    Immature Granulocyte % 10/17/2024 0.3  % Final    Glucose 10/17/2024 96  70 - 99 mg/dL Final    Sodium 10/17/2024 137  136 - 145 mmol/L Final    Potassium 10/17/2024 3.6  3.5 - 5.1 mmol/L Final    Chloride 10/17/2024 108  98 - 112 mmol/L Final    CO2 10/17/2024 25.0  21.0 - 32.0 mmol/L Final    Anion Gap 10/17/2024 4  0 - 18 mmol/L Final    BUN 10/17/2024 9  9 - 23 mg/dL Final    Creatinine 10/17/2024 0.59  0.55 - 1.02 mg/dL Final    Calcium, Total 10/17/2024 9.6  8.7 - 10.4 mg/dL Final    Calculated Osmolality 10/17/2024 283  275 - 295 mOsm/kg Final    eGFR-Cr 10/17/2024 115  >=60 mL/min/1.73m2 Final    AST 10/17/2024 17  <34 U/L Final    ALT 10/17/2024 15  10 - 49 U/L Final    Alkaline Phosphatase 10/17/2024 49  37 - 98 U/L Final    Bilirubin, Total 10/17/2024 0.3  0.3 - 1.2 mg/dL Final    Total Protein 10/17/2024 6.1  5.7 - 8.2 g/dL Final    Albumin 10/17/2024 4.0  3.2 - 4.8 g/dL Final    Globulin  10/17/2024 2.1  2.0 - 3.5 g/dL Final    A/G Ratio 10/17/2024 1.9  1.0 - 2.0 Final    Troponin I (High Sensitivity) 10/17/2024 <3  <=34 ng/L Final    SARS-CoV-2 (COVID-19) - (GeneXpert) 10/17/2024 Not Detected  Not Detected Final    Influenza A by PCR 10/17/2024 Negative  Negative Final    Influenza B by PCR 10/17/2024 Negative  Negative Final    RSV by PCR 10/17/2024 Negative  Negative Final    D-Dimer 10/17/2024 <0.27  <0.50 ug/mL FEU Final        A/P: Tiera Núñez is 43 year old presenting for the followin. Syncope. ER visit and labwork reviewed. Patient well-appearing on exam. Likely vasovagal episode ever giving blood. Discussed increasing hydration, eating 3 meals and 2 snacks per day, and getting enough sleep. Continue to monitor symptoms at home with self care.         Encounter Medications[1]        Side effects, risks and benefits of medications were explained.  The patient or  responsible adult showed the ability to learn, asked appropriate questions.  There were no barriers to learning and they verbalized understanding of the treatment plan.     Medication list provided to patient and /or family member.        Sarah Chester MD         [1]   Outpatient Encounter Medications as of 10/21/2024   Medication Sig Dispense Refill    Ferrous Sulfate (IRON SUPPLEMENT OR) Take by mouth. (Patient not taking: Reported on 10/21/2024)      PRENATAL 27-0.8 MG Oral Tab Take 1 tablet by mouth daily. (Patient not taking: Reported on 2/11/2023)       No facility-administered encounter medications on file as of 10/21/2024.

## 2024-12-14 ENCOUNTER — TELEPHONE (OUTPATIENT)
Dept: FAMILY MEDICINE CLINIC | Facility: CLINIC | Age: 44
End: 2024-12-14

## 2024-12-14 DIAGNOSIS — Z13.6 SCREENING FOR CARDIOVASCULAR CONDITION: Primary | ICD-10-CM

## 2024-12-14 DIAGNOSIS — Z00.00 LABORATORY EXAM ORDERED AS PART OF ROUTINE GENERAL MEDICAL EXAMINATION: ICD-10-CM

## 2024-12-14 NOTE — TELEPHONE ENCOUNTER
Please enter lab orders for the patient's upcoming physical appointment.     Physical scheduled:   Your appointments       Date & Time Appointment Department ([place])    Dec 30, 2024 9:00 AM CST Adult Physical with Sarah Chestre MD [facility] ([facility])    PLEASE NOTE - Most insurances allow a Complete Physical once every 366 days. Please schedule accordingly.    Please arrive 15 minutes prior to your scheduled appointment. Please also bring your Insurance card, Photo ID, and your medication bottles or a list of your current medication.    If you no longer require this appointment, please contact your physician office to cancel.              [facility] [address]  [address] [place] [address] [id number] [phone]           Preferred lab: [facility]     The patient has been notified to complete fasting labs prior to their physical appointment.

## 2025-01-28 ENCOUNTER — OFFICE VISIT (OUTPATIENT)
Dept: FAMILY MEDICINE CLINIC | Facility: CLINIC | Age: 45
End: 2025-01-28
Payer: COMMERCIAL

## 2025-01-28 VITALS
DIASTOLIC BLOOD PRESSURE: 72 MMHG | SYSTOLIC BLOOD PRESSURE: 116 MMHG | WEIGHT: 148 LBS | TEMPERATURE: 98 F | HEIGHT: 69 IN | BODY MASS INDEX: 21.92 KG/M2 | HEART RATE: 97 BPM

## 2025-01-28 DIAGNOSIS — Z12.31 VISIT FOR SCREENING MAMMOGRAM: ICD-10-CM

## 2025-01-28 DIAGNOSIS — Z00.00 ROUTINE GENERAL MEDICAL EXAMINATION AT A HEALTH CARE FACILITY: Primary | ICD-10-CM

## 2025-01-28 NOTE — PROGRESS NOTES
HPI:   Tiera Núñez is a 44 year old female who presents for a complete physical exam.  Last pap:  5/9/23  Last mammogram:  ordered  Contraception:  none  Previous colonoscopy:  n/a  Previous DEXA:  n/a.  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     More fatigue, planning for lab work.     Wt Readings from Last 6 Encounters:   01/28/25 148 lb (67.1 kg)   10/17/24 145 lb (65.8 kg)   05/09/23 160 lb (72.6 kg)   02/11/23 150 lb (68 kg)   05/21/19 154 lb 9.6 oz (70.1 kg)   04/23/19 155 lb (70.3 kg)     Body mass index is 21.86 kg/m².     Cholesterol, Total (mg/dL)   Date Value   11/16/2017 216     HDL Cholesterol (mg/dL)   Date Value   11/16/2017 79        Current Outpatient Medications   Medication Sig Dispense Refill    Ferrous Sulfate (IRON SUPPLEMENT OR) Take by mouth. (Patient not taking: Reported on 10/21/2024)      PRENATAL 27-0.8 MG Oral Tab Take 1 tablet by mouth daily. (Patient not taking: Reported on 2/11/2023)        Patient Active Problem List   Diagnosis   (none) - all problems resolved or deleted     No past medical history on file.   No past surgical history on file.   Family History   Problem Relation Age of Onset    Lipids Mother     Other (Other) Mother         thyroid    Cancer Father         melanoma, testicular    Other (Other) Daughter         ASD      Social History:   Social History     Socioeconomic History    Marital status:    Tobacco Use    Smoking status: Never    Smokeless tobacco: Never   Vaping Use    Vaping status: Never Used   Substance and Sexual Activity    Alcohol use: Yes     Types: 2 - 3 Standard drinks or equivalent per week     Comment: soc    Drug use: No    Sexual activity: Yes     Partners: Male   Other Topics Concern    Caffeine Concern Yes     Comment: 1-2 cup coffee daily    Exercise Yes      Comment: walk 2 x a week    Seat Belt Yes    Self-Exams No        REVIEW OF SYSTEMS:   GENERAL: feels well otherwise  SKIN: denies any unusual skin lesions  EYES:no vision problems  LUNGS: denies shortness of breath  CARDIOVASCULAR: denies chest pain  GI: denies abdominal pain,  No constipation or diarrhea  : denies dysuria, vaginal discharge or itching  NEURO: denies headaches  PSYCHE: denies depression or anxiety    EXAM:   /72   Pulse 97   Temp 98.4 °F (36.9 °C)   Ht 5' 9\" (1.753 m)   Wt 148 lb (67.1 kg)   LMP 01/01/2025   Breastfeeding No   BMI 21.86 kg/m²   Body mass index is 21.86 kg/m².   GENERAL: well developed, well nourished,in no apparent distress  SKIN: no rashes,no suspicious lesions  HEENT: atraumatic, normocephalic,throat are clear; dentition good  EYES:PERRLA, EOMI,conjunctiva are clear  NECK: supple,no adenopathy  BREAST: BREASTS:  Breasts are symmetrical.    Right breast: The skin, nipple ,and areola appear normal. No nipple inversion.  No nipple discharge. Tissue is mildly nodular. There are no dominant masses.  No palpable abnormality in the axilla.   Left breast:   The skin, nipple ,and areola appear normal. No nipple inversion.  No nipple discharge. Tissue is mildly nodular. There are no dominant masses.  No palpable abnormality in the axilla.   LUNGS: clear to auscultation  CARDIO: RRR without murmur  GI: good BS's,no masses, HSM or tenderness  : /  EXTREMITIES: no edema    ASSESSMENT AND PLAN:   Tiera Núñez is a 44 year old female who presents for a complete physical exam.  Encounter Diagnoses   Name Primary?    Routine general medical examination at a health care facility Yes    Visit for screening mammogram      Pap UTD  Mammogram:  ordered  Dexascan:  n/a.   Labs:  ordered  Colonoscopy:  Age 46  Vaccines recommended today:  /  Recommended low fat diet and regular exercise.    The patient is asked to return for CPX in 1 year.  Orders Placed This Encounter    Procedures    Lipid Panel    CBC, Platelet; No Differential    Comp Metabolic Panel (14)    TSH W Reflex To Free T4    Ferritin    Vitamin B12    Vitamin D, 25-Hydroxy       Meds & Refills for this Visit:  Requested Prescriptions      No prescriptions requested or ordered in this encounter       Imaging & Consults:  CASSIDY RAWLS 2D+3D SCREENING BILAT (CPT=77067/56464)

## 2025-02-02 ENCOUNTER — HOSPITAL ENCOUNTER (OUTPATIENT)
Age: 45
Discharge: HOME | End: 2025-02-02
Payer: COMMERCIAL

## 2025-02-02 VITALS
SYSTOLIC BLOOD PRESSURE: 113 MMHG | HEART RATE: 67 BPM | RESPIRATION RATE: 14 BRPM | OXYGEN SATURATION: 97 % | TEMPERATURE: 99 F | DIASTOLIC BLOOD PRESSURE: 87 MMHG

## 2025-02-02 DIAGNOSIS — J01.10 ACUTE NON-RECURRENT FRONTAL SINUSITIS: Primary | ICD-10-CM

## 2025-02-02 DIAGNOSIS — R09.81 NASAL CONGESTION: ICD-10-CM

## 2025-02-02 LAB
POCT INFLUENZA A: NEGATIVE
POCT INFLUENZA B: NEGATIVE

## 2025-02-02 RX ORDER — BENZONATATE 200 MG/1
200 CAPSULE ORAL 3 TIMES DAILY PRN
Qty: 20 CAPSULE | Refills: 0 | Status: SHIPPED | OUTPATIENT
Start: 2025-02-02

## 2025-02-02 RX ORDER — DOXYCYCLINE 100 MG/1
100 CAPSULE ORAL 2 TIMES DAILY
Qty: 14 CAPSULE | Refills: 0 | Status: SHIPPED | OUTPATIENT
Start: 2025-02-02 | End: 2025-02-09

## 2025-02-02 RX ORDER — PREDNISONE 20 MG/1
40 TABLET ORAL DAILY
Qty: 10 TABLET | Refills: 0 | Status: SHIPPED | OUTPATIENT
Start: 2025-02-02 | End: 2025-02-07

## 2025-02-02 NOTE — DISCHARGE INSTRUCTIONS
Increase water and rest  Take ibuprofen and or Tylenol as needed for pain  Take prednisone as directed till gone  Take doxycycline twice a day until gone  Start antihistamine such as Zyrtec or Claritin  Can use Tessalon Perles as needed for cough   You declined a chest x-ray at this time if anything progresses or worsens please return back for further evaluation  close follow-up with your primary care doctor  Return to ER symptoms worsen

## 2025-02-02 NOTE — ED PROVIDER NOTES
Patient Seen in: [facility]      History     Chief Complaint   Patient presents with    Cough/URI    Fatigue    Headache    Body ache and/or chills     Stated Complaint: Flu - I’ve been sick since Tuesday but today my head sinus hurts so much more m*    Subjective:   The history is provided by the patient.         A 44-year-old female presents to the immediate care due to copious nasal congestion and sinus pressure for the past several days.  Initially started with fevers nasal congestion and sneezing and a dry cough.  Fevers have since dissipated.  Cough has become more dry.  Denies any chest pain or shortness of breath.  Her nasal congestion and sinus pressure has only intensified.  Initially believed she was getting better but now worse.  Taking ibuprofen and Tylenol over-the-counter decongestants without relief.  Multiple family burrs at home with similar symptoms who are all improving but she is worsening.    Objective:     History reviewed. No pertinent past medical history.           History reviewed. No pertinent surgical history.             Social History     Socioeconomic History    Marital status:    Tobacco Use    Smoking status: Never    Smokeless tobacco: Never   Vaping Use    Vaping status: Never Used   Substance and Sexual Activity    Alcohol use: Yes     Types: 2 - 3 Standard drinks or equivalent per week     Comment: soc    Drug use: No    Sexual activity: Yes     Partners: Male   Other Topics Concern    Caffeine Concern Yes     Comment: 1-2 cup coffee daily    Exercise Yes     Comment: walk 2 x a week    Seat Belt Yes    Self-Exams No              Review of Systems   Constitutional: Negative.    HENT:  Positive for congestion, sinus pressure and sinus pain. Negative for ear discharge, ear pain, sore throat, trouble swallowing and voice change.    Respiratory:  Positive for cough. Negative for shortness of breath, wheezing and stridor.    Cardiovascular: Negative.     Gastrointestinal: Negative.        Positive for stated complaint: Flu - I’ve been sick since Tuesday but today my head sinus hurts so much more m*  Other systems are as noted in HPI.  Constitutional and vital signs reviewed.      All other systems reviewed and negative except as noted above.    Physical Exam     ED Triage Vitals [02/02/25 1451]   /87   Pulse 67   Resp 14   Temp 98.6 °F (37 °C)   Temp src Oral   SpO2 97 %   O2 Device None (Room air)       Current Vitals:   Vital Signs  BP: 113/87  Pulse: 67  Resp: 14  Temp: 98.6 °F (37 °C)  Temp src: Oral    Oxygen Therapy  SpO2: 97 %  O2 Device: None (Room air)        Physical Exam  Vitals and nursing note reviewed.   Constitutional:       General: She is not in acute distress.     Appearance: Normal appearance.   HENT:      Right Ear: Tympanic membrane, ear canal and external ear normal.      Left Ear: Tympanic membrane, ear canal and external ear normal.      Nose: Congestion present.      Mouth/Throat:      Mouth: Mucous membranes are moist.      Pharynx: No oropharyngeal exudate or posterior oropharyngeal erythema.      Comments: PND  Eyes:      Extraocular Movements: Extraocular movements intact.      Conjunctiva/sclera: Conjunctivae normal.      Pupils: Pupils are equal, round, and reactive to light.   Cardiovascular:      Rate and Rhythm: Normal rate and regular rhythm.   Pulmonary:      Effort: Pulmonary effort is normal. No respiratory distress.      Breath sounds: Normal breath sounds.   Musculoskeletal:         General: Normal range of motion.      Cervical back: Normal range of motion.   Skin:     General: Skin is warm.   Neurological:      General: No focal deficit present.      Mental Status: She is alert and oriented to person, place, and time.   Psychiatric:         Mood and Affect: Mood normal.         Behavior: Behavior normal.             ED Course     Labs Reviewed   POCT FLU TEST - Normal    Narrative:     This assay is a rapid molecular  in vitro test utilizing nucleic acid amplification of influenza A and B viral RNA.                   MDM   ddx- viral uri, allergic rhiniitis, COVID, sinusitis     on exam the patient is afebrile nontoxic Vitals signs are stable . nasal turbinates are erythematous and boggy, sinus tenderness. TMs are unremarkable . posterior pharynx shows PND . Lungs CTA. rest of the exam is unremarkable. Influenza negative offered chest x-ray however patient declines.  Due to exam and history exam consistent with sinusitis will prescribe doxycycline, prednisone, Tessalon Perles. discussed at lengths with the patient at home care strict return precuaitons and importance of close follow up. All questions were answered and patient is comfortable with [place] home        Medical Decision Making  Problems Addressed:  Acute non-recurrent frontal sinusitis: acute illness or injury  Nasal congestion: acute illness or injury        Disposition and Plan     Clinical Impression:  1. Acute non-recurrent frontal sinusitis    2. Nasal congestion         Disposition:  Discharge  2/2/2025  3:32 pm    Follow-up:  Tiara Cochran MD  [address] [place] [id number]  [phone]                Medications Prescribed:  Discharge Medication List as of 2/2/2025  3:39 PM        START taking these medications    Details   predniSONE 20 MG Oral Tab Take 2 tablets (40 mg total) by mouth daily for 5 days., Normal, Disp-10 tablet, R-0      doxycycline 100 MG Oral Cap Take 1 capsule (100 mg total) by mouth 2 (two) times daily for 7 days., Normal, Disp-14 capsule, R-0      benzonatate 200 MG Oral Cap Take 1 capsule (200 mg total) by mouth 3 (three) times daily as needed., Normal, Disp-20 capsule, R-0                 Supplementary Documentation:

## 2025-06-04 ENCOUNTER — PATIENT OUTREACH (OUTPATIENT)
Dept: FAMILY MEDICINE CLINIC | Facility: CLINIC | Age: 45
End: 2025-06-04

## 2025-07-30 LAB
ALBUMIN/GLOBULIN RATIO: 1.6 (CALC) (ref 1–2.5)
ALBUMIN: 4.1 G/DL (ref 3.6–5.1)
ALKALINE PHOSPHATASE: 71 U/L (ref 31–125)
ALT: 16 U/L (ref 6–29)
AST: 16 U/L (ref 10–30)
BILIRUBIN, TOTAL: 0.5 MG/DL (ref 0.2–1.2)
BUN/CREATININE RATIO: 28 (CALC) (ref 6–22)
BUN: 13 MG/DL (ref 7–25)
CALCIUM: 9.7 MG/DL (ref 8.6–10.2)
CARBON DIOXIDE: 26 MMOL/L (ref 20–32)
CHLORIDE: 106 MMOL/L (ref 98–110)
CHOL/HDLC RATIO: 2.5 (CALC)
CHOLESTEROL, TOTAL: 157 MG/DL
CREATININE: 0.46 MG/DL (ref 0.5–0.99)
EGFR: 122 ML/MIN/1.73M2
FERRITIN: 129 NG/ML (ref 16–232)
GLOBULIN: 2.5 G/DL (CALC) (ref 1.9–3.7)
GLUCOSE: 79 MG/DL (ref 65–99)
HDL CHOLESTEROL: 64 MG/DL
HEMATOCRIT: 40.3 % (ref 35–45)
HEMOGLOBIN: 12.6 G/DL (ref 11.7–15.5)
LDL-CHOLESTEROL: 77 MG/DL (CALC)
MCH: 26.9 PG (ref 27–33)
MCHC: 31.3 G/DL (ref 32–36)
MCV: 85.9 FL (ref 80–100)
MPV: 10.6 FL (ref 7.5–12.5)
NON-HDL CHOLESTEROL: 93 MG/DL (CALC)
PLATELET COUNT: 353 THOUSAND/UL (ref 140–400)
POTASSIUM: 4.3 MMOL/L (ref 3.5–5.3)
PROTEIN, TOTAL: 6.6 G/DL (ref 6.1–8.1)
RDW: 12.9 % (ref 11–15)
RED BLOOD CELL COUNT: 4.69 MILLION/UL (ref 3.8–5.1)
SODIUM: 138 MMOL/L (ref 135–146)
T4, FREE: 3.2 NG/DL (ref 0.8–1.8)
TRIGLYCERIDES: 84 MG/DL
TSH W/REFLEX TO FT4: <0.01 MIU/L
VITAMIN B12: 399 PG/ML (ref 200–1100)
VITAMIN D, 25-OH, TOTAL: 47 NG/ML (ref 30–100)
WHITE BLOOD CELL COUNT: 6.7 THOUSAND/UL (ref 3.8–10.8)

## 2025-08-01 ENCOUNTER — RESULTS FOLLOW-UP (OUTPATIENT)
Dept: FAMILY MEDICINE CLINIC | Facility: CLINIC | Age: 45
End: 2025-08-01

## 2025-08-01 DIAGNOSIS — R79.89 ABNORMAL THYROID BLOOD TEST: Primary | ICD-10-CM

## 2025-08-11 ENCOUNTER — HOSPITAL ENCOUNTER (OUTPATIENT)
Age: 45
Discharge: HOME | End: 2025-08-11

## 2025-08-11 ENCOUNTER — APPOINTMENT (OUTPATIENT)
Dept: ULTRASOUND IMAGING | Age: 45
End: 2025-08-11
Attending: NURSE PRACTITIONER

## 2025-08-11 ENCOUNTER — OFFICE VISIT (OUTPATIENT)
Dept: FAMILY MEDICINE CLINIC | Facility: CLINIC | Age: 45
End: 2025-08-11

## 2025-08-11 VITALS
HEART RATE: 76 BPM | HEIGHT: 69 IN | BODY MASS INDEX: 19.4 KG/M2 | DIASTOLIC BLOOD PRESSURE: 77 MMHG | SYSTOLIC BLOOD PRESSURE: 134 MMHG | TEMPERATURE: 98 F | OXYGEN SATURATION: 97 % | RESPIRATION RATE: 16 BRPM | WEIGHT: 131 LBS

## 2025-08-11 VITALS
HEART RATE: 89 BPM | OXYGEN SATURATION: 96 % | WEIGHT: 130 LBS | BODY MASS INDEX: 19.26 KG/M2 | DIASTOLIC BLOOD PRESSURE: 81 MMHG | HEIGHT: 69 IN | RESPIRATION RATE: 18 BRPM | SYSTOLIC BLOOD PRESSURE: 137 MMHG | TEMPERATURE: 98 F

## 2025-08-11 DIAGNOSIS — M25.462 SWELLING OF LEFT KNEE: Primary | ICD-10-CM

## 2025-08-11 DIAGNOSIS — M79.605 ACUTE PAIN OF LEFT LOWER EXTREMITY: Primary | ICD-10-CM

## 2025-08-11 PROCEDURE — 99213 OFFICE O/P EST LOW 20 MIN: CPT

## 2025-08-11 PROCEDURE — 99214 OFFICE O/P EST MOD 30 MIN: CPT

## 2025-08-11 PROCEDURE — 93971 EXTREMITY STUDY: CPT | Performed by: NURSE PRACTITIONER

## 2025-08-11 RX ORDER — FLUTICASONE PROPIONATE 50 MCG
2 SPRAY, SUSPENSION (ML) NASAL DAILY
COMMUNITY
Start: 2025-07-21

## 2025-08-12 LAB
T4, FREE: 4.7 NG/DL (ref 0.8–1.8)
TRAB: 6.63 IU/L
TSH: <0.01 MIU/L

## 2025-08-19 ENCOUNTER — OFFICE VISIT (OUTPATIENT)
Facility: CLINIC | Age: 45
End: 2025-08-19

## 2025-08-19 ENCOUNTER — ULTRASOUND ENCOUNTER (OUTPATIENT)
Facility: CLINIC | Age: 45
End: 2025-08-19

## 2025-08-19 VITALS
BODY MASS INDEX: 19.61 KG/M2 | DIASTOLIC BLOOD PRESSURE: 62 MMHG | SYSTOLIC BLOOD PRESSURE: 120 MMHG | HEIGHT: 69 IN | WEIGHT: 132.38 LBS

## 2025-08-19 DIAGNOSIS — R10.2 PELVIC PAIN: Primary | ICD-10-CM

## 2025-08-19 DIAGNOSIS — R10.2 PELVIC PAIN: ICD-10-CM

## 2025-08-19 DIAGNOSIS — Z12.4 CERVICAL CANCER SCREENING: ICD-10-CM

## 2025-08-19 DIAGNOSIS — Z01.419 ENCOUNTER FOR WELL WOMAN EXAM WITH ROUTINE GYNECOLOGICAL EXAM: ICD-10-CM

## 2025-08-19 DIAGNOSIS — Z01.419 WELL WOMAN EXAM WITH ROUTINE GYNECOLOGICAL EXAM: Primary | ICD-10-CM

## 2025-08-19 PROCEDURE — 3078F DIAST BP <80 MM HG: CPT

## 2025-08-19 PROCEDURE — 76830 TRANSVAGINAL US NON-OB: CPT | Performed by: OBSTETRICS & GYNECOLOGY

## 2025-08-19 PROCEDURE — 99386 PREV VISIT NEW AGE 40-64: CPT

## 2025-08-19 PROCEDURE — 3008F BODY MASS INDEX DOCD: CPT

## 2025-08-19 PROCEDURE — 87624 HPV HI-RISK TYP POOLED RSLT: CPT

## 2025-08-19 PROCEDURE — 88175 CYTOPATH C/V AUTO FLUID REDO: CPT

## 2025-08-19 PROCEDURE — 3074F SYST BP LT 130 MM HG: CPT

## 2025-08-19 PROCEDURE — 76856 US EXAM PELVIC COMPLETE: CPT | Performed by: OBSTETRICS & GYNECOLOGY

## 2025-08-20 LAB — HPV E6+E7 MRNA CVX QL NAA+PROBE: NEGATIVE

## 2025-08-26 ENCOUNTER — OFFICE VISIT (OUTPATIENT)
Dept: FAMILY MEDICINE CLINIC | Facility: CLINIC | Age: 45
End: 2025-08-26

## 2025-08-26 VITALS
TEMPERATURE: 99 F | BODY MASS INDEX: 19.7 KG/M2 | HEIGHT: 69 IN | WEIGHT: 133 LBS | SYSTOLIC BLOOD PRESSURE: 118 MMHG | DIASTOLIC BLOOD PRESSURE: 66 MMHG | RESPIRATION RATE: 16 BRPM

## 2025-08-26 DIAGNOSIS — E05.90 HYPERTHYROIDISM: Primary | ICD-10-CM

## 2025-08-26 PROCEDURE — 3078F DIAST BP <80 MM HG: CPT | Performed by: FAMILY MEDICINE

## 2025-08-26 PROCEDURE — 99213 OFFICE O/P EST LOW 20 MIN: CPT | Performed by: FAMILY MEDICINE

## 2025-08-26 PROCEDURE — 3074F SYST BP LT 130 MM HG: CPT | Performed by: FAMILY MEDICINE

## 2025-08-26 PROCEDURE — 3008F BODY MASS INDEX DOCD: CPT | Performed by: FAMILY MEDICINE

## (undated) NOTE — Clinical Note
IMPRESSION:  1.  IUP at 25 3/7 wks  2.  normal fetal echocardiogram3. Previous child with large ASD4. History of macrosomiaRecommendations:  1. Third trimester growth US at 26wks4.    weekly NST at 36wks

## (undated) NOTE — LETTER
Date & Time: 2/2/2025, 3:32 PM  Patient: Tiera Núñez  Encounter Provider(s):    Milly Caldwell PA       To Whom It May Concern:    Tiera Núñez was seen and treated in our department on 2/2/2025. She should not return to work until 02/03/25 .    If you have any questions or concerns, please do not hesitate to call.        _____________________________  Physician/APC Signature

## (undated) NOTE — LETTER
Date & Time: 2/2/2025, 3:32 PM  Patient: Tiera Núñez  Encounter Provider(s):    Milly Caldwell PA       To Whom It May Concern:    Tiera Núñez was seen and treated in our department on 2/2/2025. She should not return to work until 02/04/25 .    If you have any questions or concerns, please do not hesitate to call.        _____________________________  Physician/APC Signature

## (undated) NOTE — Clinical Note
IMPRESSION:  1.  IUP at 21 1/7 wks  2. Scan consistent with dates   3. No ultrasound evidence of structural abnormalities are seen 4. History of macrosomia5. Previous child with large ASDRecommendations:  1. Third trimester growth US at 26wks4.    Feta